# Patient Record
Sex: MALE | Race: BLACK OR AFRICAN AMERICAN | Employment: UNEMPLOYED | URBAN - METROPOLITAN AREA
[De-identification: names, ages, dates, MRNs, and addresses within clinical notes are randomized per-mention and may not be internally consistent; named-entity substitution may affect disease eponyms.]

---

## 2018-10-02 PROBLEM — I21.A1 TYPE 2 MYOCARDIAL INFARCTION (HCC): Status: ACTIVE | Noted: 2018-10-02

## 2018-10-02 PROBLEM — N18.30 TYPE 2 DM WITH CKD STAGE 3 AND HYPERTENSION (HCC): Status: ACTIVE | Noted: 2018-10-02

## 2018-10-02 PROBLEM — E11.22 TYPE 2 DM WITH CKD STAGE 3 AND HYPERTENSION (HCC): Status: ACTIVE | Noted: 2018-10-02

## 2018-10-02 PROBLEM — R55 SYNCOPE AND COLLAPSE: Status: ACTIVE | Noted: 2018-10-02

## 2018-10-02 PROBLEM — I12.9 TYPE 2 DM WITH CKD STAGE 3 AND HYPERTENSION (HCC): Status: ACTIVE | Noted: 2018-10-02

## 2018-10-03 PROBLEM — R77.8 ELEVATED TROPONIN: Status: ACTIVE | Noted: 2018-10-03

## 2018-10-03 PROBLEM — R55 SYNCOPE: Status: ACTIVE | Noted: 2018-10-03

## 2021-08-03 PROBLEM — R55 SYNCOPE: Status: RESOLVED | Noted: 2018-10-03 | Resolved: 2021-08-03

## 2022-07-21 ENCOUNTER — OFFICE VISIT (OUTPATIENT)
Dept: ORTHOPEDIC SURGERY | Age: 72
End: 2022-07-21
Payer: MEDICARE

## 2022-07-21 DIAGNOSIS — G89.29 CHRONIC LEFT SHOULDER PAIN: ICD-10-CM

## 2022-07-21 DIAGNOSIS — M25.512 CHRONIC LEFT SHOULDER PAIN: ICD-10-CM

## 2022-07-21 DIAGNOSIS — M25.511 CHRONIC PAIN OF BOTH SHOULDERS: Primary | ICD-10-CM

## 2022-07-21 DIAGNOSIS — M25.512 CHRONIC PAIN OF BOTH SHOULDERS: Primary | ICD-10-CM

## 2022-07-21 DIAGNOSIS — M47.812 CERVICAL SPONDYLOSIS: ICD-10-CM

## 2022-07-21 DIAGNOSIS — G89.29 CHRONIC PAIN OF BOTH SHOULDERS: Primary | ICD-10-CM

## 2022-07-21 PROCEDURE — 3017F COLORECTAL CA SCREEN DOC REV: CPT | Performed by: PHYSICAL MEDICINE & REHABILITATION

## 2022-07-21 PROCEDURE — 99204 OFFICE O/P NEW MOD 45 MIN: CPT | Performed by: PHYSICAL MEDICINE & REHABILITATION

## 2022-07-21 PROCEDURE — G8427 DOCREV CUR MEDS BY ELIG CLIN: HCPCS | Performed by: PHYSICAL MEDICINE & REHABILITATION

## 2022-07-21 PROCEDURE — 1101F PT FALLS ASSESS-DOCD LE1/YR: CPT | Performed by: PHYSICAL MEDICINE & REHABILITATION

## 2022-07-21 PROCEDURE — G8432 DEP SCR NOT DOC, RNG: HCPCS | Performed by: PHYSICAL MEDICINE & REHABILITATION

## 2022-07-21 PROCEDURE — G8421 BMI NOT CALCULATED: HCPCS | Performed by: PHYSICAL MEDICINE & REHABILITATION

## 2022-07-21 PROCEDURE — G8536 NO DOC ELDER MAL SCRN: HCPCS | Performed by: PHYSICAL MEDICINE & REHABILITATION

## 2022-07-21 PROCEDURE — 1123F ACP DISCUSS/DSCN MKR DOCD: CPT | Performed by: PHYSICAL MEDICINE & REHABILITATION

## 2022-07-21 RX ORDER — AMLODIPINE BESYLATE 5 MG/1
TABLET ORAL
COMMUNITY
Start: 2021-12-10

## 2022-07-21 RX ORDER — GLIPIZIDE 10 MG/1
TABLET, FILM COATED, EXTENDED RELEASE ORAL
COMMUNITY
Start: 2022-06-23

## 2022-07-21 RX ORDER — INSULIN LISPRO 100 [IU]/ML
INJECTION, SOLUTION INTRAVENOUS; SUBCUTANEOUS
COMMUNITY
Start: 2022-07-11

## 2022-07-21 RX ORDER — DICLOFENAC SODIUM 10 MG/G
GEL TOPICAL 4 TIMES DAILY
Qty: 500 G | Refills: 3 | Status: SHIPPED | OUTPATIENT
Start: 2022-07-21 | End: 2022-08-20

## 2022-07-21 RX ORDER — SENNOSIDES 8.6 MG/1
TABLET ORAL
COMMUNITY

## 2022-07-21 RX ORDER — ACETAMINOPHEN 500 MG
TABLET ORAL
COMMUNITY

## 2022-07-21 RX ORDER — DORZOLAMIDE HYDROCHLORIDE AND TIMOLOL MALEATE 20; 5 MG/ML; MG/ML
SOLUTION/ DROPS OPHTHALMIC
COMMUNITY

## 2022-07-21 RX ORDER — MECLIZINE HYDROCHLORIDE 25 MG/1
TABLET ORAL
COMMUNITY
Start: 2021-06-09

## 2022-07-21 RX ORDER — TRAMADOL HYDROCHLORIDE 50 MG/1
TABLET ORAL
COMMUNITY

## 2022-07-21 RX ORDER — OXYBUTYNIN CHLORIDE 5 MG/1
TABLET ORAL
COMMUNITY
Start: 2021-12-10

## 2022-07-21 NOTE — PROGRESS NOTES
[address]  [address], [address]  [place], [address] [place] [address] Street  Phone: [phone]  Fax: [phone]      Patient: Nilsa Small                                                                              MRN: [id number]        YOB: 1950          AGE: 67 y.o. PCP: Luda Lisa MD  Date:  07/21/22    Reason for Consultation: Shoulder Pain and Neck Pain      HPI:  Nilsa Small is a 67 y.o. male with relevant PMH of DM with CKD,  CAD s/p stent in 1993 who presents with left shoulder pain which began in 1983 when he fell off a truck. He had what sounds like an AC joint separation or distal clavicle fracture at the time. He states he did not have surgery and was put in a sling. He reports since that time he has had pain and stiffness in his shoulder. He also reports decreased cervical ROM, and at times pain radiating down his left hand with tingling    Right elbow-  he fell in 2016 and injured his right elbow. He had x-rays at the time which demonstrated an avulsion injury at the lateral epicondyle. Since that time he has noticed he is not able to fully extend his elbow    Neurologic symptoms: + tingling in left arm. No weakness, bowel or bladder changes. No recent falls      Location: The pain is located in the left shoulder pain   Radiation: The pain does radiate down the left arm . Pain Score: Currently: 5/10    Quality: Pain is of a Achy and Tingling quality. Aggravating: Pain is exacerbated by  reaching arm , turning head  Alleviating: The pain is alleviated by nothing    Prior Treatments:  Physical therapy: NO  Injections:NO    Previous Medications:    Current Medications:Aleve, tramadol- for severe pain. Gabapentin 300mg bid. Previous work-up has included:   X-ray cervical spine 12/14/2021  Moderate to severe disc space height loss with endplate spurring at N6/L3 and C6/C7.  Multilevel uncovertebral hypertrophy and facet arthropathy. The tip of the odontoid processes obscured on the open-mouth view. There is mild bony encroachment of the neural foramen on the right at C6/C7 andon the left at C3/C4, C4/C5, and C5/C6. X-ray right forearm in 2016  Findings:  Two views of the right forearm. No fracture of the right radius or ulna. Ossific density adjacent to the lateral humeral condyle seen on the frontal projection only. No gross malalignment of the elbow or wrist. There is a 3 mm ossific density in the soft tissues in the mid ulnar forearm. IMPRESSION   Impression:     No fracture of the right radius or ulna. X-ray right elbow 2016  There are 3 views of the right elbow. Irregular ossification adjacent to the lateral humeral condyle measuring 7.7 x 0.3 cm. Then there is subtle increased sclerosis and cystic change of the lateral humeral condyle. Relative preservation of the joint spaces. Small osteophytes of the radial head. Enthesopathy at the olecranon. There may be a tiny effusion. Posteriorolecranon soft tissues are mildly thickened and edematous. Past Medical History:   Past Medical History:   Diagnosis Date    Diabetes ([facility] [address]     Hypertension       Past Surgical History:   Past Surgical History:   Procedure Laterality Date    HX CORONARY STENT PLACEMENT  1997      SocHx:   Social History     Tobacco Use    Smoking status: Never    Smokeless tobacco: Never   Substance Use Topics    Alcohol use: No      FamHx:?    Family History   Family history unknown: Yes       Current Medications:    Current Outpatient Medications   Medication Sig Dispense Refill    amLODIPine (NORVASC) 5 mg tablet amlodipine 5 mg tablet   TAKE 1 TABLET BY MOUTH EVERY DAY      cholecalciferol (VITAMIN D3) (2,000 UNITS /50 MCG) cap capsule Vitamin D3 50 mcg (2,000 unit) capsule   take 1 capsule by mouth once daily      dorzolamide-timoloL (COSOPT) 22.3-6.8 mg/mL ophthalmic solution dorzolamide 22.3 mg-timolol 6.8 mg/mL eye drops empagliflozin (JARDIANCE) 10 mg tablet Jardiance 10 mg tablet   TAKE 1 TABLET BY MOUTH EVERY DAY      glipiZIDE SR (GLUCOTROL XL) 10 mg CR tablet TAKE 1 TABLET BY MOUTH EVERY DAY AS DIRECTED      HumaLOG KwikPen Insulin 100 unit/mL kwikpen       meclizine (ANTIVERT) 25 mg tablet meclizine 25 mg tablet   TAKE 1 TABLET BY MOUTH THREE TIMES A DAY AS NEEDED      oxybutynin (DITROPAN) 5 mg tablet oxybutynin chloride 5 mg tablet   TAKE 1 TABLET BY MOUTH EVERY DAY      senna (SENOKOT) 8.6 mg tablet Senna Lax 8.6 mg tablet   take 2 tablets by mouth at bedtime      traMADoL (ULTRAM) 50 mg tablet tramadol 50 mg tablet   TAKE 1 TABLET BY MOUTH DAILY AS NEEDED FOR SEVERE PAIN      atenolol (TENORMIN) 100 mg tablet Take 0.5 Tabs by mouth daily. 1 Tab 0    valsartan (DIOVAN) 320 mg tablet Take 0.5 Tabs by mouth daily. 1 Tab 0    atorvastatin (LIPITOR) 80 mg tablet Take 80 mg by mouth daily. SITagliptin (JANUVIA) 100 mg tablet Take 100 mg by mouth daily. gabapentin (NEURONTIN) 300 mg capsule Take 300 mg by mouth daily. ergocalciferol (ERGOCALCIFEROL) 1,250 mcg (50,000 unit) capsule Take 50,000 Units by mouth every seven (7) days. insulin glargine (LANTUS) 100 unit/mL injection 70 Units by SubCUTAneous route two (2) times a day. aspirin 81 mg chewable tablet Take 81 mg by mouth daily. calcium citrate 200 mg (950 mg) tablet Take 950 mg by mouth daily. acetaminophen (TYLENOL) 650 mg TbER Take 650 mg by mouth every eight (8) hours as needed. multivitamin (ONE A DAY) tablet Take 1 Tab by mouth daily.         Allergies:  No Known Allergies     Review of Systems:   Gen:    Denied fevers, chills, malaise, fatigue, weight changes   Resp: Denied shortness of breath, cough, wheezing   CVS: Denied chest pain, palpitations   : Denied urinary urgency, frequency, incontinence   GI: Denied nausea, vomiting, constipation, diarrhea   Skin: Denied rashes, wounds   Psych: Denied anxiety, depression Vasc: Denied claudication, ulcers   Hem: Denied easy bruising/bleeding   MSK: See HPI   Neuro: See HPI         Physical Exam     Vital Signs: There were no vitals taken for this visit. General: ??????? Well nourished and well developed male without any acute distress   Psychiatric: ?  Alert and oriented x 3 with normal mood    HEENT: ???????? Atraumatic   Respiratory:   Breathing non-labored and non dyspneic   CV: ???????????????? Peripheral pulses intact, no peripheral edema   Skin: ????????????? No rashes       Neurologic: ?? Sensation: normal and grossly intact thebilateral, upper extremity(s)   Strength: 5/5 in the bilateral, upper extremity(s)   Reflexes: reveals 2+ symmetric DTRs throughout UE  Gait: normal   Upper tract signs: Hines's negative ? Musculoskeletal: Shoulder Exam   Inspection:   Alignment:Abnormal prominence left AC joint  Atrophy: None   Effusion: None     Tenderness to Palpation:   Subacromial space: Negative  Long head of the biceps tendon: Negative  Coracoid process:Negative  AC joint: Positive left  Periscapular muscles:Positive left  Cervical/Thoracic paraspinals: None   Cervical/Thoracic spinous processes: None        ROM:     Shoulder ROM: Normal  Shoulder Resisted ROM: Normal  Cervical Instability: None   Cervical ROM: limited cervical rotation        Special Tests    Hawkin's Test: Negative  Neer's Test: Negative  Empty Can Test: Negative  Scarf Test:  Positive  Speed's Test:  Negative  Spurlings Maneuver: Negative  Hoffmans: Negative    Right elbow, thickening at the olecranon, no tenderness, no effusion/bursitis  Right elbow lacks a few degrees of full extension,   No tenderness at olecranon, lateral or medial epicondyle  No pain with resisted supination or pronation     Medical Decision Making:    Images: The imaging results as well as the actual images of the studies below were reviewed, visualized and interpreted by me. Labs:   The results below were reviewed. X-ray left shoulder 7/21/22- AC joint arthritis     X-ray cervical spine 12/14/2021  Moderate to severe disc space height loss with endplate spurring at D9/K8 and C6/C7. Multilevel uncovertebral hypertrophy and facet arthropathy. The tip of the odontoid processes obscured on the open-mouth view. There is mild bony encroachment of the neural foramen on the right at C6/C7 andon the left at C3/C4, C4/C5, and C5/C6. X-ray right forearm in 2016  Findings:  Two views of the right forearm. No fracture of the right radius or ulna. Ossific density adjacent to the lateral humeral condyle seen on the frontal projection only. No gross malalignment of the elbow or wrist. There is a 3 mm ossific density in the soft tissues in the mid ulnar forearm. IMPRESSION   Impression:     No fracture of the right radius or ulna. X-ray right elbow 2016  There are 3 views of the right elbow. Irregular ossification adjacent to the lateral humeral condyle measuring 7.7 x 0.3 cm. Then there is subtle increased sclerosis and cystic change of the lateral humeral condyle. Relative preservation of the joint spaces. Small osteophytes of the radial head. Enthesopathy at the olecranon. There may be a tiny effusion. Posteriorolecranon soft tissues are mildly thickened and edematous. Assessment:   - left shoulder pain-? Prior AC separation   -right elbow pain-remote lateral epicondyle evolsiun injur  -cervical spondylosis, ? left cervical radiculitis        Plan:      -Physical therapy -  referral to PT for cervical ROM and shoulder ROM   -Medications - volatern gel . Counseled regarding side effects and appropriate administration of medications.    -Diagnostics/Imaging - x-ray left shoulder today   -Injections - NA  -Education - The patient's diagnosis, prognosis and treatment options were discussed today. All questions were answered. F/U - in 8 weeks, after PT is complete.  Consider further imaging cervical spine [address] [facility]

## 2022-08-09 ENCOUNTER — HOSPITAL ENCOUNTER (OUTPATIENT)
Dept: PHYSICAL THERAPY | Age: 72
Discharge: HOME | End: 2022-08-09
Payer: MEDICARE

## 2022-08-09 PROCEDURE — 97161 PT EVAL LOW COMPLEX 20 MIN: CPT

## 2022-08-09 NOTE — PROGRESS NOTES
[facility] AT [address]  Phone: [phone] Fax: [phone] / STATEMENT OF MEDICAL NECESSITY FOR PHYSICAL THERAPY SERVICES  Patient Name: Ludy Ahumada : 1950   Medical   Diagnosis: Neck pain [M54.2]  Left shoulder pain [M25.512] Treatment Diagnosis: Left shoulder pain, neck pain   Onset Date: 22 (referral)     Referral Source: Sharad Blakely Start [facility]): 2022   Prior Hospitalization: See medical history Provider #: [id number]   Prior Level of Function: Functionally independent, right handed   Comorbidities: HTN, DM II, PVD   Medications: Verified on Patient Summary List   The Plan of Care and following information is based on the information from the initial evaluation.     ========================================================================    Assessment / key information:  Pt is a pleasant 67 y.o. male who presents with c/o left shoulder pain and neck pain. The patient reports a chronic history of left shoulder weakness/discomfort since he fractured his left clavicle and sprained his left AC joint in in ; he now reports decreased left shoulder mobility, feelings of heaviness in his left shoulder, and left shoulder blade tingling/fatigue with activity. He also reports intermittent paresthesias into left 5th digit, medial forearm that were not reproduced with exam. Signs/symptoms at eval consistent with mechanical left shoulder pain/neck pain secondary to likely cervical spondylosis, impaired postural awareness, and impaired left parascapular strength. Special testing (-) for radiculopathy/TOS. Functional deficits include: decreased shoulder flexion AROM, decreased shoulder strength, decreased cervical mobility. Rehab potential is fair due to chronicity of condition.  Pt would benefit from skilled PT to address above deficits to improve Pt's function and ability to return to PLOF with decreased pain and improved strength.      ========================================================================    Eval Complexity: History: HIGH Complexity :3+ comorbidities / personal factors will impact the outcome/ POC Exam:LOW Complexity : 1-2 Standardized tests and measures addressing body structure, function, activity limitation and / or participation in recreation  Presentation: LOW Complexity : Stable, uncomplicated  Clinical Decision Making:MEDIUM Complexity : FOTO score of 26-74Overall Complexity:LOW   Problem List: pain affecting function, decrease ROM, decrease strength, decrease ADL/ functional abilitiies, decrease activity tolerance, decrease flexibility/ joint mobility, and decrease transfer abilities   Treatment Plan may include any combination of the following: Therapeutic exercise, Therapeutic activities, Neuromuscular re-education, Physical agent/modality, Gait/balance training, Manual therapy, Aquatic therapy, Patient education, Self Care training, Functional mobility training, Home safety training, and Stair training  Patient / Family readiness to learn indicated by: asking questions    Persons(s) to be included in education: patient (P)  Barriers to Learning/Limitations: None  Measures taken:    Patient Goal (s): \"Decrease pain, improve mobility\"   Patient self reported health status: fair  Rehabilitation Potential: fair    GOALS-  Short Term Goals: To be accomplished in 1 week  - Goal: Pt to be compliant with initial HEP to improve shoulder range of motion and pain. Status at last note/certification: Established and reviewed with Pt  Long Term Goals:  To be accomplished in 10 treatments  - Goal: Pt to demonstrate 5/5 shoulder flexion MMT of left shoulder to improve overhead lifting ability   Status at last note/certification: 4/5 shoulder flexion MMT  - Goal: Pt to demonstrate at least 150 deg of left shoulder flexion AROM in seated position to facilitate ease of donning/doffing clothes, overhead lifting, and grooming tasks. Status at last note/certification: shoulder flexion left 143 deg  - Goal: Pt to demonstrate at least 45 deg of B cervical rotation AROM to improve ease with visual scanning. Status at last note/certification: right 35 deg, left 26 de  - Goal: Pt to report < 4/10 pain at worst to return to PLOF activities. Status at last note/certification: 2/26 pain at worst  - Goal: Pt to report FOTO score of at least 70 pts to show improved function and quality of life. Status at last note/certification: FOTO 51 pts       Frequency / Duration:   -Patient would benefit from skilled PT 2 times per week for 24-36 sessions as needed in this certification period. Goals will be assigned and reassessed every 10 visits/ 30 days per Medicare guidelines     Patient / Caregiver education and instruction: self care, activity modification, and exercises    Therapist Signature: Nestoria Riding Date: 5/8/1022   Certification Period: 8/9/22-11/7/22 Time: 12:45 PM   ========================================================================  I certify that the above Physical Therapy Services are being furnished while the patient is under my care. I agree with the treatment plan and certify that this therapy is necessary. Physician Signature:        Date:       Time: ___                                            Mayra Barber*. Please sign and return to [facility] or you may fax the signed copy to [phone]. Thank you.

## 2022-08-09 NOTE — PROGRESS NOTES
PT DAILY TREATMENT NOTE     Patient Name: Fatuma Fields  Date:2022  : 1950  [x]  Patient  Verified  Payor: BLUE CROSS MEDICARE / Plan: Naren Forte 8141 HMO / Product Type: Managed Care Medicare /    In time:11:35  Out time:12:22  Total Treatment Time (min): 52  Visit #: 1 of 10    Medicare/BCBS Only   Total Timed Codes (min):  25 1:1 Treatment Time:  47       Treatment Area: Neck pain [M54.2]  Left shoulder pain [M25.512]    SUBJECTIVE  Pain Level (0-10 scale): 4  Any medication changes, allergies to medications, adverse drug reactions, diagnosis change, or new procedure performed?: [x] No    [] Yes (see summary sheet for update)  Subjective functional status/changes:   [] No changes reported    CC: neck pain  History/Mechanism of Injury: broke left clavicle in , continued deficits since that time  Current Symptoms/Complaints: impaired cervical range of motion  Pain in left shoulder blade- feels tired/ tingling  Left UE pain - need Gabapentin  Decreased left shoulder strength-poor endurance  Broke left clavicle in   Pain-  Current: 4/10     Worst: 7/10   Best: 1-2/10  Aggravated By: prolonged overhead activity, lifting heavy objects, mowing the lawn/yard work  Alleviated By: Tramadol, Gabepentin  Previous Treatment/Compliance: never hat PT  PMHx/Surgical Hx: HTN, DM II, hx of left clavicle fx  Work Hx: formerly in building maintenance, now retired  Living Situation: lives with wife  Hobbies: yard work  PLOF: functionally independent,   Limitations to PLOF: difficulty turning the lawnmower, decreased ability to perform overhead activity  Pt Goals: decrease pain, improve strength/stability      OBJECTIVE    22 min [x]Eval                  []Re-Eval     15 min Therapeutic Exercise:  [] See flow sheet :   Rationale: increase ROM and increase strength to improve the patients ability to improve postural awareness and improve ease with yardwork    10 min Therapeutic Activity: []  See flow sheet : Patient education on therapy assessment, prognosis, expectations for therapy sessions, patient goals, importance of improved postural awareness, and HEP. Rationale: to improve the patients ability to adhere to HEP and therapy sessions for increased compliance when working toward therapy goals.           With   [] TE   [x] TA   [] neuro   [] other: Patient Education: [x] Review HEP    [] Progressed/Changed HEP based on:   [] positioning   [] body mechanics   [] transfers   [] heat/ice application    [] other:      Other Objective/Functional Measures:     Observation: forward head, rounded shoulders  Palpation: TTP at left upper trap, levator scap, left pectoral musculature, left coracoid process    Shoulder AROM/PROM:                                           AROM (deg)              PROM (deg)   Right Left Right Left   Flexion 178 169     Extension       Scaption       ER at 45 Deg ABD 90 90     IR at 45 Deg ABD 41 11     Seated Flexion  156 143 --------------- ---------------     Functional ER: right to opposite scapula, left to upper cervical spine  Functional IR: right to superior lumbar spine, left to left SIJ     C/S Screening:   Flexion 35 deg springy  Extension 10 deg firm, hard end feel  Rotation right 35 deg, left 26 deg  Spurlings (-) B       Strength:   Right (/5) Left (/5)   GHJ   Flexion 5 4             Abduction 5 4             Extension 5 5             ER 5 4             IR 5 4   Upper Trapezius  5 5   Middle/Lower Trap     Elbow Flexion 5 5   Elbow Extension 5 5    Strength       Scapulohumeral Rhythm: poor scapular retraction noted in seated position, impaired     Reflexes/Sensation: describes daily numbness/tingling in left forearm, left 5th digit    Special Tests :      Right Left   Machuca-Ramón    +   Cross Arm  +   Speed's     Painful Arc  +      Right Left   ER Lag  -   Drop Arm  -       Pain Level (0-10 scale) post treatment: 4    ASSESSMENT/Changes in Function: See POC    Patient will continue to benefit from skilled PT services to modify and progress therapeutic interventions, address functional mobility deficits, address ROM deficits, address strength deficits, analyze and address soft tissue restrictions, analyze and cue movement patterns, analyze and modify body mechanics/ergonomics, assess and modify postural abnormalities, address imbalance/dizziness and instruct in home and community integration to attain remaining goals.      [x]  See Plan of Care  []  See progress note/recertification  []  See Discharge Summary         Progress towards goals / Updated goals:  See POC    PLAN  [x]  Upgrade activities as tolerated     []  Continue plan of care  [x]  Update interventions per flow sheet       []  Discharge due to:_  []  Other:_      Darci Parent 8/9/2022  11:31 AM    Future Appointments   Date Time Provider Zhane Coelho   9/15/2022  9:30 AM Omer Castellon MD [facility]

## 2022-08-11 ENCOUNTER — APPOINTMENT (OUTPATIENT)
Dept: PHYSICAL THERAPY | Age: 72
End: 2022-08-11
Payer: MEDICARE

## 2022-08-16 ENCOUNTER — APPOINTMENT (OUTPATIENT)
Dept: PHYSICAL THERAPY | Age: 72
End: 2022-08-16
Payer: MEDICARE

## 2022-08-18 ENCOUNTER — APPOINTMENT (OUTPATIENT)
Dept: PHYSICAL THERAPY | Age: 72
End: 2022-08-18
Payer: MEDICARE

## 2022-08-18 NOTE — PROGRESS NOTES
[facility] AT [address], [place]  Phone: [phone] Fax [phone] SUMMARY  Patient Name: Rocael Ventura : 1950   Treatment/Medical Diagnosis: Neck pain [M54.2]  Left shoulder pain [M25.512]   Referral Source: Anthonymarcelino Loandeidre*     Date of Initial Visit: 22 Attended Visits: 1 Missed Visits: 0     SUMMARY OF TREATMENT  Pt is a pleasant 67 y.o. male who presents with c/o left shoulder pain and neck pain. Treatment consisted of initial evaluation and pt education on deficits and role of physical therapy in addressing limitations. CURRENT STATUS  Pt attended initial evaluation for treatment of neck pain and left shoulder pain. Pt will be discharged at this time without further assessment after deciding that physical therapy would not be useful after first session. Short Term Goals: To be accomplished in 1 week  - Goal: Pt to be compliant with initial HEP to improve shoulder range of motion and pain. Status at last note/certification: Established and reviewed with Pt  Current: unable to reassess due to unexpected discharge  Long Term Goals: To be accomplished in 10 treatments  - Goal: Pt to demonstrate 5/5 shoulder flexion MMT of left shoulder to improve overhead lifting ability   Status at last note/certification: 4/5 shoulder flexion MMT  Current: unable to reassess due to unexpected discharge  - Goal: Pt to demonstrate at least 150 deg of left shoulder flexion AROM in seated position to facilitate ease of donning/doffing clothes, overhead lifting, and grooming tasks. Status at last note/certification: shoulder flexion left 143 deg  Current: unable to reassess due to unexpected discharge  - Goal: Pt to demonstrate at least 45 deg of B cervical rotation AROM to improve ease with visual scanning.   Status at last note/certification: right 35 deg, left 26 deg  Current: unable to reassess due to unexpected discharge  - Goal: Pt to report < 4/10 pain at worst to return to PLOF activities. Status at last note/certification: 5/48 pain at worst  Current: unable to reassess due to unexpected discharge  - Goal: Pt to report FOTO score of at least 70 pts to show improved function and quality of life. Status at last note/certification: FOTO 51 pts   Current: unable to reassess due to unexpected discharge    RECOMMENDATIONS  Discontinue therapy due to lack of attendance or compliance. If you have any questions/comments please contact us directly at [phone]. Thank you for allowing us to assist in the care of your patient.   Therapist Signature: Juliette Aguilar Date: 8/18/22   Reporting Period: 8/9/22-8/9/22 Time: 12:31 PM

## 2022-08-23 ENCOUNTER — APPOINTMENT (OUTPATIENT)
Dept: PHYSICAL THERAPY | Age: 72
End: 2022-08-23
Payer: MEDICARE

## 2022-08-25 ENCOUNTER — APPOINTMENT (OUTPATIENT)
Dept: PHYSICAL THERAPY | Age: 72
End: 2022-08-25
Payer: MEDICARE

## 2022-08-30 ENCOUNTER — APPOINTMENT (OUTPATIENT)
Dept: PHYSICAL THERAPY | Age: 72
End: 2022-08-30
Payer: MEDICARE

## 2022-09-01 ENCOUNTER — APPOINTMENT (OUTPATIENT)
Dept: PHYSICAL THERAPY | Age: 72
End: 2022-09-01

## 2022-09-06 ENCOUNTER — APPOINTMENT (OUTPATIENT)
Dept: PHYSICAL THERAPY | Age: 72
End: 2022-09-06

## 2022-09-13 ENCOUNTER — APPOINTMENT (OUTPATIENT)
Dept: PHYSICAL THERAPY | Age: 72
End: 2022-09-13

## 2022-09-15 ENCOUNTER — APPOINTMENT (OUTPATIENT)
Dept: PHYSICAL THERAPY | Age: 72
End: 2022-09-15

## 2022-09-20 ENCOUNTER — APPOINTMENT (OUTPATIENT)
Dept: PHYSICAL THERAPY | Age: 72
End: 2022-09-20